# Patient Record
Sex: MALE | Race: WHITE | NOT HISPANIC OR LATINO | URBAN - METROPOLITAN AREA
[De-identification: names, ages, dates, MRNs, and addresses within clinical notes are randomized per-mention and may not be internally consistent; named-entity substitution may affect disease eponyms.]

---

## 2024-03-11 ENCOUNTER — HOSPITAL ENCOUNTER (OUTPATIENT)
Dept: RADIOLOGY | Facility: EXTERNAL LOCATION | Age: 24
Discharge: HOME | End: 2024-03-11

## 2024-03-11 DIAGNOSIS — M25.561 RIGHT KNEE PAIN, UNSPECIFIED CHRONICITY: ICD-10-CM

## 2025-05-06 ENCOUNTER — APPOINTMENT (OUTPATIENT)
Dept: PRIMARY CARE | Facility: CLINIC | Age: 25
End: 2025-05-06
Payer: COMMERCIAL

## 2025-05-06 VITALS
WEIGHT: 147 LBS | RESPIRATION RATE: 16 BRPM | BODY MASS INDEX: 20.58 KG/M2 | HEIGHT: 71 IN | HEART RATE: 65 BPM | DIASTOLIC BLOOD PRESSURE: 76 MMHG | SYSTOLIC BLOOD PRESSURE: 135 MMHG

## 2025-05-06 DIAGNOSIS — Z00.01 ENCOUNTER FOR GENERAL ADULT MEDICAL EXAMINATION WITH ABNORMAL FINDINGS: Primary | ICD-10-CM

## 2025-05-06 DIAGNOSIS — J30.9 ALLERGIC RHINITIS, UNSPECIFIED SEASONALITY, UNSPECIFIED TRIGGER: ICD-10-CM

## 2025-05-06 DIAGNOSIS — R23.9 SKIN CHANGE: ICD-10-CM

## 2025-05-06 PROCEDURE — 3008F BODY MASS INDEX DOCD: CPT | Performed by: FAMILY MEDICINE

## 2025-05-06 PROCEDURE — 99385 PREV VISIT NEW AGE 18-39: CPT | Performed by: FAMILY MEDICINE

## 2025-05-06 PROCEDURE — 1036F TOBACCO NON-USER: CPT | Performed by: FAMILY MEDICINE

## 2025-05-06 RX ORDER — CETIRIZINE HYDROCHLORIDE 10 MG/1
10 TABLET ORAL DAILY
Qty: 30 TABLET | Refills: 2 | Status: SHIPPED | OUTPATIENT
Start: 2025-05-06 | End: 2025-08-04

## 2025-05-06 RX ORDER — GUAIFENESIN 600 MG/1
1200 TABLET, EXTENDED RELEASE ORAL 2 TIMES DAILY
Qty: 40 TABLET | Refills: 0 | Status: SHIPPED | OUTPATIENT
Start: 2025-05-06 | End: 2025-06-05

## 2025-05-06 RX ORDER — FLUTICASONE PROPIONATE 50 MCG
1 SPRAY, SUSPENSION (ML) NASAL DAILY
Qty: 16 G | Refills: 11 | Status: SHIPPED | OUTPATIENT
Start: 2025-05-06 | End: 2026-05-06

## 2025-05-06 NOTE — PATIENT INSTRUCTIONS
USE [email or web address].  CALL FOR NEEDS [phone].   KEEP ON MEDICATIONS  KEEP SPECIALTY APPOINTMENTS.    EVALUATE AND TREAT FOR ALLERIES CALL DR FREDY RECINOS [phone]   GET FASTED LABS DONE.   KEEP DERM CARE DR GORAN MARTINEZ.

## 2025-05-06 NOTE — PROGRESS NOTES
"Subjective   Patient ID: Mahad Haji is a 24 y.o. male who presents for Establish Care (BLOODWORK-IGG/LIPID PANEL/DISCUSS FOOD ALLERGIES/SEASONAL ALLERGIES ).    HPI    Establish Care (BLOODWORK-IGG/LIPID PANEL/DISCUSS FOOD ALLERGIES/SEASONAL ALLERGIES ).    LAST PCP 3 YRS AGO  SPECIALISTS:   NUTRITIONIST AND DIETICIAN., ASKS FOR LABS AND IGG PANEL AND SENSITIVITIES.      Review of Systems   All other systems reviewed and are negative.    THROAT PAIN AND FOOD POISONING LATELY.      LICORICE SUPPLEMENT FOR GERD SXS RELIEF.    MAY BENEFIT WITH PPI PPX MUCINEX AND OR SIMETHICONE.     Objective   /76   Pulse 65   Resp 16   Ht 1.803 m (5' 11\")   Wt 66.7 kg (147 lb)   BMI 20.50 kg/m²     Physical Exam  Vitals and nursing note reviewed.   Constitutional:       Appearance: Normal appearance.      Comments: TALL THIN MALE POINTS TO TENDERNESS AT LATERAL PROXIMAL RT TIBIA. BENIGN EXAM.     HENT:      Head: Normocephalic.      Right Ear: Tympanic membrane, ear canal and external ear normal.      Left Ear: Tympanic membrane, ear canal and external ear normal.      Nose: Nose normal.      Mouth/Throat:      Mouth: Mucous membranes are moist.      Pharynx: Oropharynx is clear.   Eyes:      Conjunctiva/sclera: Conjunctivae normal.      Pupils: Pupils are equal, round, and reactive to light.   Cardiovascular:      Rate and Rhythm: Normal rate and regular rhythm.      Pulses: Normal pulses.      Heart sounds: Normal heart sounds.   Pulmonary:      Effort: Pulmonary effort is normal.      Breath sounds: Normal breath sounds.   Abdominal:      General: Bowel sounds are normal.      Palpations: Abdomen is soft.   Musculoskeletal:         General: Normal range of motion.      Cervical back: Normal range of motion and neck supple.   Skin:     General: Skin is warm and dry.   Neurological:      General: No focal deficit present.      Mental Status: Mental status is at baseline.   Psychiatric:         Mood and Affect: Mood " normal.         Behavior: Behavior normal.         Thought Content: Thought content normal.         Judgment: Judgment normal.         Assessment/Plan   Assessment & Plan  Encounter for general adult medical examination with abnormal findings    Orders:    Lipid Panel; Future    Sedimentation Rate; Future    Urinalysis with Reflex Microscopic; Future    TSH with reflex to Free T4 if abnormal; Future    Comprehensive Metabolic Panel; Future    CBC and Auto Differential; Future    Follow Up In Primary Care - Established; Future    Hepatitis C antibody; Future    HIV 1/2 Antigen/Antibody Screen with Reflex to Confirmation; Future    Varicella zoster antibody, IgG; Future    Measles (Rubeola) Antibody, IgG; Future    Allergic rhinitis, unspecified seasonality, unspecified trigger    Orders:    cetirizine (ZyrTEC) 10 mg tablet; Take 1 tablet (10 mg) by mouth once daily.    fluticasone (Flonase) 50 mcg/actuation nasal spray; Administer 1 spray into each nostril once daily. Shake gently. Before first use, prime pump. After use, clean tip and replace cap.    Lipid Panel; Future    Sedimentation Rate; Future    Urinalysis with Reflex Microscopic; Future    TSH with reflex to Free T4 if abnormal; Future    Comprehensive Metabolic Panel; Future    CBC and Auto Differential; Future    Gluten IgG; Future    Peanut IgG4; Future    Wheat IgG; Future    Egg, White IgG; Future    Respiratory Allergy Profile IgE; Future    Brazil Nut Component Panel; Future    Macadamia nut IgE; Future    Referral to Allergy; Future    Follow Up In Primary Care - Established; Future    Varicella zoster antibody, IgG; Future    Measles (Rubeola) Antibody, IgG; Future    guaiFENesin (Mucinex) 600 mg 12 hr tablet; Take 2 tablets (1,200 mg) by mouth 2 times a day. Do not crush, chew, or split.    Skin change    Orders:    Referral to Dermatology

## 2025-05-11 LAB
A ALTERNATA IGE QN: NORMAL
A ALTERNATA IGE RAST: NORMAL
A FUMIGATUS IGE QN: NORMAL
A FUMIGATUS IGE RAST: NORMAL
ALBUMIN SERPL-MCNC: 4.7 G/DL (ref 3.6–5.1)
ALP SERPL-CCNC: 55 U/L (ref 36–130)
ALT SERPL-CCNC: 28 U/L (ref 9–46)
ANION GAP SERPL CALCULATED.4IONS-SCNC: 8 MMOL/L (CALC) (ref 7–17)
APPEARANCE UR: CLEAR
AST SERPL-CCNC: 16 U/L (ref 10–40)
BASOPHILS # BLD AUTO: 61 CELLS/UL (ref 0–200)
BASOPHILS NFR BLD AUTO: 1.1 %
BERMUDA GRASS IGE QN: NORMAL
BERMUDA GRASS IGE RAST: NORMAL
BILIRUB SERPL-MCNC: 0.5 MG/DL (ref 0.2–1.2)
BILIRUB UR QL STRIP: NEGATIVE
BOXELDER IGE QN: NORMAL
BOXELDER IGE RAST: NORMAL
BRAZIL NUT (RBER E) 1 IGE QN: NORMAL
BUN SERPL-MCNC: 19 MG/DL (ref 7–25)
C HERBARUM IGE QN: NORMAL
C HERBARUM IGE RAST: NORMAL
CALCIUM SERPL-MCNC: 10 MG/DL (ref 8.6–10.3)
CALIF WALNUT POLN IGE QN: NORMAL
CALIF WALNUT POLN IGE RAST: NORMAL
CAT DANDER IGE QN: NORMAL
CAT DANDER IGE RAST: NORMAL
CHLORIDE SERPL-SCNC: 102 MMOL/L (ref 98–110)
CHOLEST SERPL-MCNC: 140 MG/DL
CHOLEST/HDLC SERPL: 2.5 (CALC)
CMN PIGWEED IGE QN: NORMAL
CMN PIGWEED IGE RAST: NORMAL
CO2 SERPL-SCNC: 30 MMOL/L (ref 20–32)
COLOR UR: YELLOW
COMMON RAGWEED IGE QN: NORMAL
COMMON RAGWEED IGE RAST: NORMAL
COTTONWOOD IGE QN: NORMAL
COTTONWOOD IGE RAST: NORMAL
CREAT SERPL-MCNC: 1.01 MG/DL (ref 0.6–1.24)
D FARINAE IGE QN: NORMAL
D FARINAE IGE RAST: NORMAL
D PTERONYSS IGE QN: NORMAL
D PTERONYSS IGE RAST: NORMAL
DOG DANDER IGE QN: NORMAL
DOG DANDER IGE RAST: NORMAL
EGFRCR SERPLBLD CKD-EPI 2021: 105 ML/MIN/1.73M2
EGG WHITE IGG-MCNC: NORMAL UG/ML
EOSINOPHIL # BLD AUTO: 182 CELLS/UL (ref 15–500)
EOSINOPHIL NFR BLD AUTO: 3.3 %
ERYTHROCYTE [DISTWIDTH] IN BLOOD BY AUTOMATED COUNT: 12.6 % (ref 11–15)
ERYTHROCYTE [SEDIMENTATION RATE] IN BLOOD BY WESTERGREN METHOD: 2 MM/H
GLUCOSE SERPL-MCNC: 89 MG/DL (ref 65–99)
GLUCOSE UR QL STRIP: NEGATIVE
GLUTEN IGG-MCNC: NORMAL UG/ML
HCT VFR BLD AUTO: 46.1 % (ref 38.5–50)
HCV AB SERPL QL IA: NORMAL
HDLC SERPL-MCNC: 56 MG/DL
HGB BLD-MCNC: 15 G/DL (ref 13.2–17.1)
HGB UR QL STRIP: NEGATIVE
HIV 1+2 AB+HIV1 P24 AG SERPL QL IA: NORMAL
IGE SERPL-ACNC: NORMAL [IU]/L
KETONES UR QL STRIP: NEGATIVE
LDLC SERPL CALC-MCNC: 69 MG/DL (CALC)
LEUKOCYTE ESTERASE UR QL STRIP: NEGATIVE
LONDON PLANE IGE QN: NORMAL
LONDON PLANE IGE RAST: NORMAL
LYMPHOCYTES # BLD AUTO: 1848 CELLS/UL (ref 850–3900)
LYMPHOCYTES NFR BLD AUTO: 33.6 %
MACADAMIA IGE QN: NORMAL
MACADAMIA IGE RAST: NORMAL
MCH RBC QN AUTO: 29.3 PG (ref 27–33)
MCHC RBC AUTO-ENTMCNC: 32.5 G/DL (ref 32–36)
MCV RBC AUTO: 90 FL (ref 80–100)
MEV IGG SER IA-ACNC: 244 AU/ML
MONOCYTES # BLD AUTO: 512 CELLS/UL (ref 200–950)
MONOCYTES NFR BLD AUTO: 9.3 %
MOUSE URINE PROT IGE QN: NORMAL
MOUSE URINE PROT IGE RAST: NORMAL
MT JUNIPER IGE QN: NORMAL
MT JUNIPER IGE RAST: NORMAL
NEUTROPHILS # BLD AUTO: 2899 CELLS/UL (ref 1500–7800)
NEUTROPHILS NFR BLD AUTO: 52.7 %
NITRITE UR QL STRIP: NEGATIVE
NONHDLC SERPL-MCNC: 84 MG/DL (CALC)
P NOTATUM IGE QN: NORMAL
P NOTATUM IGE RAST: NORMAL
PEANUT IGG4 AB [MASS/VOLUME] IN SERUM: NORMAL UG/ML
PECAN/HICK TREE IGE QN: NORMAL
PECAN/HICK TREE IGE RAST: NORMAL
PH UR STRIP: 5.5 [PH] (ref 5–8)
PLATELET # BLD AUTO: 240 THOUSAND/UL (ref 140–400)
PMV BLD REES-ECKER: 10.1 FL (ref 7.5–12.5)
POTASSIUM SERPL-SCNC: 4.3 MMOL/L (ref 3.5–5.3)
PROT SERPL-MCNC: 7 G/DL (ref 6.1–8.1)
PROT UR QL STRIP: NEGATIVE
RBC # BLD AUTO: 5.12 MILLION/UL (ref 4.2–5.8)
REF LAB TEST REF RANGE: NORMAL
ROACH IGE QN: NORMAL
ROACH IGE RAST: NORMAL
SALTWORT IGE QN: NORMAL
SALTWORT IGE RAST: NORMAL
SHEEP SORREL IGE QN: NORMAL
SHEEP SORREL IGE RAST: NORMAL
SILVER BIRCH IGE QN: NORMAL
SILVER BIRCH IGE RAST: NORMAL
SODIUM SERPL-SCNC: 140 MMOL/L (ref 135–146)
SP GR UR STRIP: 1.03 (ref 1–1.03)
TIMOTHY IGE QN: NORMAL
TIMOTHY IGE RAST: NORMAL
TRIGL SERPL-MCNC: 69 MG/DL
TSH SERPL-ACNC: 1.47 MIU/L (ref 0.4–4.5)
VZV IGG SER IA-ACNC: <1 S/CO
WBC # BLD AUTO: 5.5 THOUSAND/UL (ref 3.8–10.8)
WHEAT IGG-MCNC: NORMAL UG/ML
WHITE ASH IGE QN: NORMAL
WHITE ASH IGE RAST: NORMAL
WHITE ELM IGE QN: NORMAL
WHITE ELM IGE RAST: NORMAL
WHITE MULBERRY IGE QN: NORMAL
WHITE MULBERRY IGE RAST: NORMAL
WHITE OAK IGE QN: NORMAL
WHITE OAK IGE RAST: NORMAL

## 2025-05-13 LAB
A ALTERNATA IGE QN: <0.1 KU/L
A ALTERNATA IGE RAST: 0
A FUMIGATUS IGE QN: <0.1 KU/L
A FUMIGATUS IGE RAST: 0
ALBUMIN SERPL-MCNC: 4.7 G/DL (ref 3.6–5.1)
ALP SERPL-CCNC: 55 U/L (ref 36–130)
ALT SERPL-CCNC: 28 U/L (ref 9–46)
ANION GAP SERPL CALCULATED.4IONS-SCNC: 8 MMOL/L (CALC) (ref 7–17)
APPEARANCE UR: CLEAR
AST SERPL-CCNC: 16 U/L (ref 10–40)
BASOPHILS # BLD AUTO: 61 CELLS/UL (ref 0–200)
BASOPHILS NFR BLD AUTO: 1.1 %
BERMUDA GRASS IGE QN: 0.66 KU/L
BERMUDA GRASS IGE RAST: 1
BILIRUB SERPL-MCNC: 0.5 MG/DL (ref 0.2–1.2)
BILIRUB UR QL STRIP: NEGATIVE
BOXELDER IGE QN: 3.21 KU/L
BOXELDER IGE RAST: 2
BRAZIL NUT (RBER E) 1 IGE QN: <0.1 KU/L
BUN SERPL-MCNC: 19 MG/DL (ref 7–25)
C HERBARUM IGE QN: <0.1 KU/L
C HERBARUM IGE RAST: 0
CALCIUM SERPL-MCNC: 10 MG/DL (ref 8.6–10.3)
CALIF WALNUT POLN IGE QN: 1.04 KU/L
CALIF WALNUT POLN IGE RAST: 2
CAT (RFEL D) 1 IGE QN: 1.64 KU/L
CAT (RFEL D) 4 IGE QN: <0.1 KU/L
CAT (RFEL D) 7 IGE QN: <0.1 KU/L
CAT DANDER IGE QN: 1.33 KU/L
CAT DANDER IGE RAST: 2
CHLORIDE SERPL-SCNC: 102 MMOL/L (ref 98–110)
CHOLEST SERPL-MCNC: 140 MG/DL
CHOLEST/HDLC SERPL: 2.5 (CALC)
CMN PIGWEED IGE QN: 0.71 KU/L
CMN PIGWEED IGE RAST: 2
CO2 SERPL-SCNC: 30 MMOL/L (ref 20–32)
COLOR UR: YELLOW
COMMON RAGWEED IGE QN: 0.13 KU/L
COMMON RAGWEED IGE RAST: ABNORMAL
COTTONWOOD IGE QN: 0.34 KU/L
COTTONWOOD IGE RAST: ABNORMAL
CREAT SERPL-MCNC: 1.01 MG/DL (ref 0.6–1.24)
D FARINAE IGE QN: 14.9 KU/L
D FARINAE IGE RAST: 3
D PTERONYSS IGE QN: 8.79 KU/L
D PTERONYSS IGE RAST: 3
DOG (RCAN F) 1 IGE QN: <0.1 KU/L
DOG (RCAN F) 2 IGE QN: <0.1 KU/L
DOG (RCAN F) 4 IGE QN: <0.1 KU/L
DOG (RCAN F) 5 IGE QN: <0.1 KU/L
DOG (RCAN F) 6 IGE QN: 0.34 KU/L
DOG DANDER IGE QN: 0.69 KU/L
DOG DANDER IGE RAST: 1
EGFRCR SERPLBLD CKD-EPI 2021: 105 ML/MIN/1.73M2
EGG WHITE IGG-MCNC: NORMAL UG/ML
EOSINOPHIL # BLD AUTO: 182 CELLS/UL (ref 15–500)
EOSINOPHIL NFR BLD AUTO: 3.3 %
ERYTHROCYTE [DISTWIDTH] IN BLOOD BY AUTOMATED COUNT: 12.6 % (ref 11–15)
ERYTHROCYTE [SEDIMENTATION RATE] IN BLOOD BY WESTERGREN METHOD: 2 MM/H
GLUCOSE SERPL-MCNC: 89 MG/DL (ref 65–99)
GLUCOSE UR QL STRIP: NEGATIVE
GLUTEN IGG-MCNC: NORMAL UG/ML
HCT VFR BLD AUTO: 46.1 % (ref 38.5–50)
HCV AB SERPL QL IA: NORMAL
HDLC SERPL-MCNC: 56 MG/DL
HGB BLD-MCNC: 15 G/DL (ref 13.2–17.1)
HGB UR QL STRIP: NEGATIVE
HIV 1+2 AB+HIV1 P24 AG SERPL QL IA: NORMAL
IGE SERPL-ACNC: 243 KU/L
KETONES UR QL STRIP: NEGATIVE
LDLC SERPL CALC-MCNC: 69 MG/DL (CALC)
LEUKOCYTE ESTERASE UR QL STRIP: NEGATIVE
LONDON PLANE IGE QN: 0.18 KU/L
LONDON PLANE IGE RAST: ABNORMAL
LYMPHOCYTES # BLD AUTO: 1848 CELLS/UL (ref 850–3900)
LYMPHOCYTES NFR BLD AUTO: 33.6 %
MACADAMIA IGE QN: <0.1 KU/L
MACADAMIA IGE RAST: 0
MCH RBC QN AUTO: 29.3 PG (ref 27–33)
MCHC RBC AUTO-ENTMCNC: 32.5 G/DL (ref 32–36)
MCV RBC AUTO: 90 FL (ref 80–100)
MEV IGG SER IA-ACNC: 244 AU/ML
MONOCYTES # BLD AUTO: 512 CELLS/UL (ref 200–950)
MONOCYTES NFR BLD AUTO: 9.3 %
MOUSE URINE PROT IGE QN: <0.1 KU/L
MOUSE URINE PROT IGE RAST: 0
MT JUNIPER IGE QN: <0.1 KU/L
MT JUNIPER IGE RAST: 0
NEUTROPHILS # BLD AUTO: 2899 CELLS/UL (ref 1500–7800)
NEUTROPHILS NFR BLD AUTO: 52.7 %
NITRITE UR QL STRIP: NEGATIVE
NONHDLC SERPL-MCNC: 84 MG/DL (CALC)
P NOTATUM IGE QN: <0.1 KU/L
P NOTATUM IGE RAST: 0
PEANUT IGG4 AB [MASS/VOLUME] IN SERUM: <0.3 MCG/ML
PECAN/HICK TREE IGE QN: 4.51 KU/L
PECAN/HICK TREE IGE RAST: 3
PH UR STRIP: 5.5 [PH] (ref 5–8)
PLATELET # BLD AUTO: 240 THOUSAND/UL (ref 140–400)
PMV BLD REES-ECKER: 10.1 FL (ref 7.5–12.5)
POTASSIUM SERPL-SCNC: 4.3 MMOL/L (ref 3.5–5.3)
PROT SERPL-MCNC: 7 G/DL (ref 6.1–8.1)
PROT UR QL STRIP: NEGATIVE
QUEST CAT DANDER COMP PNL FEL D 2 (E220) IGE: <0.1 KU/L
QUEST DOG DANDER COMP PNL CAN F 3 (E221) IGE: <0.1 KU/L
RBC # BLD AUTO: 5.12 MILLION/UL (ref 4.2–5.8)
REF LAB TEST REF RANGE: NORMAL
ROACH IGE QN: <0.1 KU/L
ROACH IGE RAST: 0
SALTWORT IGE QN: 0.86 KU/L
SALTWORT IGE RAST: 2
SHEEP SORREL IGE QN: 0.11 KU/L
SHEEP SORREL IGE RAST: ABNORMAL
SILVER BIRCH IGE QN: 18.1 KU/L
SILVER BIRCH IGE RAST: 4
SODIUM SERPL-SCNC: 140 MMOL/L (ref 135–146)
SP GR UR STRIP: 1.03 (ref 1–1.03)
TIMOTHY IGE QN: 24 KU/L
TIMOTHY IGE RAST: 4
TRIGL SERPL-MCNC: 69 MG/DL
TSH SERPL-ACNC: 1.47 MIU/L (ref 0.4–4.5)
VZV IGG SER IA-ACNC: <1 S/CO
WBC # BLD AUTO: 5.5 THOUSAND/UL (ref 3.8–10.8)
WHEAT IGG-MCNC: NORMAL UG/ML
WHITE ASH IGE QN: 2.24 KU/L
WHITE ASH IGE RAST: 2
WHITE ELM IGE QN: 1.05 KU/L
WHITE ELM IGE RAST: 2
WHITE MULBERRY IGE QN: <0.1 KU/L
WHITE MULBERRY IGE RAST: 0
WHITE OAK IGE QN: 17.5 KU/L
WHITE OAK IGE RAST: 4

## 2025-05-19 LAB
A ALTERNATA IGE QN: <0.1 KU/L
A ALTERNATA IGE RAST: 0
A FUMIGATUS IGE QN: <0.1 KU/L
A FUMIGATUS IGE RAST: 0
ALBUMIN SERPL-MCNC: 4.7 G/DL (ref 3.6–5.1)
ALP SERPL-CCNC: 55 U/L (ref 36–130)
ALT SERPL-CCNC: 28 U/L (ref 9–46)
ANION GAP SERPL CALCULATED.4IONS-SCNC: 8 MMOL/L (CALC) (ref 7–17)
APPEARANCE UR: CLEAR
AST SERPL-CCNC: 16 U/L (ref 10–40)
BASOPHILS # BLD AUTO: 61 CELLS/UL (ref 0–200)
BASOPHILS NFR BLD AUTO: 1.1 %
BERMUDA GRASS IGE QN: 0.66 KU/L
BERMUDA GRASS IGE RAST: 1
BILIRUB SERPL-MCNC: 0.5 MG/DL (ref 0.2–1.2)
BILIRUB UR QL STRIP: NEGATIVE
BOXELDER IGE QN: 3.21 KU/L
BOXELDER IGE RAST: 2
BRAZIL NUT (RBER E) 1 IGE QN: <0.1 KU/L
BUN SERPL-MCNC: 19 MG/DL (ref 7–25)
C HERBARUM IGE QN: <0.1 KU/L
C HERBARUM IGE RAST: 0
CALCIUM SERPL-MCNC: 10 MG/DL (ref 8.6–10.3)
CALIF WALNUT POLN IGE QN: 1.04 KU/L
CALIF WALNUT POLN IGE RAST: 2
CAT (RFEL D) 1 IGE QN: 1.64 KU/L
CAT (RFEL D) 4 IGE QN: <0.1 KU/L
CAT (RFEL D) 7 IGE QN: <0.1 KU/L
CAT DANDER IGE QN: 1.33 KU/L
CAT DANDER IGE RAST: 2
CHLORIDE SERPL-SCNC: 102 MMOL/L (ref 98–110)
CHOLEST SERPL-MCNC: 140 MG/DL
CHOLEST/HDLC SERPL: 2.5 (CALC)
CMN PIGWEED IGE QN: 0.71 KU/L
CMN PIGWEED IGE RAST: 2
CO2 SERPL-SCNC: 30 MMOL/L (ref 20–32)
COLOR UR: YELLOW
COMMON RAGWEED IGE QN: 0.13 KU/L
COMMON RAGWEED IGE RAST: ABNORMAL
COTTONWOOD IGE QN: 0.34 KU/L
COTTONWOOD IGE RAST: ABNORMAL
CREAT SERPL-MCNC: 1.01 MG/DL (ref 0.6–1.24)
D FARINAE IGE QN: 14.9 KU/L
D FARINAE IGE RAST: 3
D PTERONYSS IGE QN: 8.79 KU/L
D PTERONYSS IGE RAST: 3
DOG (RCAN F) 1 IGE QN: <0.1 KU/L
DOG (RCAN F) 2 IGE QN: <0.1 KU/L
DOG (RCAN F) 4 IGE QN: <0.1 KU/L
DOG (RCAN F) 5 IGE QN: <0.1 KU/L
DOG (RCAN F) 6 IGE QN: 0.34 KU/L
DOG DANDER IGE QN: 0.69 KU/L
DOG DANDER IGE RAST: 1
EGFRCR SERPLBLD CKD-EPI 2021: 105 ML/MIN/1.73M2
EGG WHITE IGG-MCNC: 11 MCG/ML
EOSINOPHIL # BLD AUTO: 182 CELLS/UL (ref 15–500)
EOSINOPHIL NFR BLD AUTO: 3.3 %
ERYTHROCYTE [DISTWIDTH] IN BLOOD BY AUTOMATED COUNT: 12.6 % (ref 11–15)
ERYTHROCYTE [SEDIMENTATION RATE] IN BLOOD BY WESTERGREN METHOD: 2 MM/H
GLUCOSE SERPL-MCNC: 89 MG/DL (ref 65–99)
GLUCOSE UR QL STRIP: NEGATIVE
GLUTEN IGG-MCNC: 14.5 MCG/ML
HCT VFR BLD AUTO: 46.1 % (ref 38.5–50)
HCV AB SERPL QL IA: NORMAL
HDLC SERPL-MCNC: 56 MG/DL
HGB BLD-MCNC: 15 G/DL (ref 13.2–17.1)
HGB UR QL STRIP: NEGATIVE
HIV 1+2 AB+HIV1 P24 AG SERPL QL IA: NORMAL
IGE SERPL-ACNC: 243 KU/L
KETONES UR QL STRIP: NEGATIVE
LDLC SERPL CALC-MCNC: 69 MG/DL (CALC)
LEUKOCYTE ESTERASE UR QL STRIP: NEGATIVE
LONDON PLANE IGE QN: 0.18 KU/L
LONDON PLANE IGE RAST: ABNORMAL
LYMPHOCYTES # BLD AUTO: 1848 CELLS/UL (ref 850–3900)
LYMPHOCYTES NFR BLD AUTO: 33.6 %
MACADAMIA IGE QN: <0.1 KU/L
MACADAMIA IGE RAST: 0
MCH RBC QN AUTO: 29.3 PG (ref 27–33)
MCHC RBC AUTO-ENTMCNC: 32.5 G/DL (ref 32–36)
MCV RBC AUTO: 90 FL (ref 80–100)
MEV IGG SER IA-ACNC: 244 AU/ML
MONOCYTES # BLD AUTO: 512 CELLS/UL (ref 200–950)
MONOCYTES NFR BLD AUTO: 9.3 %
MOUSE URINE PROT IGE QN: <0.1 KU/L
MOUSE URINE PROT IGE RAST: 0
MT JUNIPER IGE QN: <0.1 KU/L
MT JUNIPER IGE RAST: 0
NEUTROPHILS # BLD AUTO: 2899 CELLS/UL (ref 1500–7800)
NEUTROPHILS NFR BLD AUTO: 52.7 %
NITRITE UR QL STRIP: NEGATIVE
NONHDLC SERPL-MCNC: 84 MG/DL (CALC)
P NOTATUM IGE QN: <0.1 KU/L
P NOTATUM IGE RAST: 0
PEANUT IGG4 AB [MASS/VOLUME] IN SERUM: <0.3 MCG/ML
PECAN/HICK TREE IGE QN: 4.51 KU/L
PECAN/HICK TREE IGE RAST: 3
PH UR STRIP: 5.5 [PH] (ref 5–8)
PLATELET # BLD AUTO: 240 THOUSAND/UL (ref 140–400)
PMV BLD REES-ECKER: 10.1 FL (ref 7.5–12.5)
POTASSIUM SERPL-SCNC: 4.3 MMOL/L (ref 3.5–5.3)
PROT SERPL-MCNC: 7 G/DL (ref 6.1–8.1)
PROT UR QL STRIP: NEGATIVE
QUEST CAT DANDER COMP PNL FEL D 2 (E220) IGE: <0.1 KU/L
QUEST DOG DANDER COMP PNL CAN F 3 (E221) IGE: <0.1 KU/L
RBC # BLD AUTO: 5.12 MILLION/UL (ref 4.2–5.8)
REF LAB TEST REF RANGE: NORMAL
ROACH IGE QN: <0.1 KU/L
ROACH IGE RAST: 0
SALTWORT IGE QN: 0.86 KU/L
SALTWORT IGE RAST: 2
SHEEP SORREL IGE QN: 0.11 KU/L
SHEEP SORREL IGE RAST: ABNORMAL
SILVER BIRCH IGE QN: 18.1 KU/L
SILVER BIRCH IGE RAST: 4
SODIUM SERPL-SCNC: 140 MMOL/L (ref 135–146)
SP GR UR STRIP: 1.03 (ref 1–1.03)
TIMOTHY IGE QN: 24 KU/L
TIMOTHY IGE RAST: 4
TRIGL SERPL-MCNC: 69 MG/DL
TSH SERPL-ACNC: 1.47 MIU/L (ref 0.4–4.5)
VZV IGG SER IA-ACNC: <1 S/CO
WBC # BLD AUTO: 5.5 THOUSAND/UL (ref 3.8–10.8)
WHEAT IGG-MCNC: 18 MCG/ML
WHITE ASH IGE QN: 2.24 KU/L
WHITE ASH IGE RAST: 2
WHITE ELM IGE QN: 1.05 KU/L
WHITE ELM IGE RAST: 2
WHITE MULBERRY IGE QN: <0.1 KU/L
WHITE MULBERRY IGE RAST: 0
WHITE OAK IGE QN: 17.5 KU/L
WHITE OAK IGE RAST: 4

## 2025-05-28 DIAGNOSIS — J30.9 ALLERGIC RHINITIS, UNSPECIFIED SEASONALITY, UNSPECIFIED TRIGGER: ICD-10-CM

## 2025-05-28 RX ORDER — CETIRIZINE HYDROCHLORIDE 10 MG/1
10 TABLET ORAL DAILY
Qty: 90 TABLET | Refills: 1 | Status: SHIPPED | OUTPATIENT
Start: 2025-05-28

## 2025-06-06 ENCOUNTER — APPOINTMENT (OUTPATIENT)
Dept: PRIMARY CARE | Facility: CLINIC | Age: 25
End: 2025-06-06
Payer: COMMERCIAL

## 2025-06-06 VITALS
TEMPERATURE: 98.1 F | WEIGHT: 148 LBS | SYSTOLIC BLOOD PRESSURE: 113 MMHG | HEART RATE: 72 BPM | DIASTOLIC BLOOD PRESSURE: 58 MMHG | BODY MASS INDEX: 20.64 KG/M2 | RESPIRATION RATE: 16 BRPM | OXYGEN SATURATION: 100 %

## 2025-06-06 DIAGNOSIS — Z00.01 ENCOUNTER FOR GENERAL ADULT MEDICAL EXAMINATION WITH ABNORMAL FINDINGS: ICD-10-CM

## 2025-06-06 DIAGNOSIS — J30.9 ALLERGIC RHINITIS, UNSPECIFIED SEASONALITY, UNSPECIFIED TRIGGER: Primary | ICD-10-CM

## 2025-06-06 DIAGNOSIS — G43.009 MIGRAINE WITHOUT AURA AND WITHOUT STATUS MIGRAINOSUS, NOT INTRACTABLE: ICD-10-CM

## 2025-06-06 PROCEDURE — 1036F TOBACCO NON-USER: CPT | Performed by: FAMILY MEDICINE

## 2025-06-06 PROCEDURE — 99395 PREV VISIT EST AGE 18-39: CPT | Performed by: FAMILY MEDICINE

## 2025-06-06 RX ORDER — CETIRIZINE HYDROCHLORIDE, PSEUDOEPHEDRINE HYDROCHLORIDE 5; 120 MG/1; MG/1
1 TABLET, FILM COATED, EXTENDED RELEASE ORAL 2 TIMES DAILY
Qty: 60 TABLET | Refills: 11 | Status: SHIPPED | OUTPATIENT
Start: 2025-06-06 | End: 2026-06-06

## 2025-06-06 RX ORDER — FLUTICASONE PROPIONATE 50 MCG
1 SPRAY, SUSPENSION (ML) NASAL DAILY
Qty: 16 G | Refills: 11 | Status: SHIPPED | OUTPATIENT
Start: 2025-06-06 | End: 2026-06-06

## 2025-06-06 RX ORDER — CETIRIZINE HYDROCHLORIDE 10 MG/1
10 TABLET ORAL DAILY
Qty: 90 TABLET | Refills: 1 | Status: SHIPPED | OUTPATIENT
Start: 2025-06-06

## 2025-06-06 NOTE — PROGRESS NOTES
Subjective   Patient ID: Mahad Haji is a 24 y.o. male who presents for URI (Congestion ).    URI        2 hrs outdoors Wednesday and awoke sinus congestion & feeling poorly.    FLONASE AND ZYRTEC IN USE AND CANNOT BREATH VIA LEFT NOSTRIL.      #5 MIGRAINE DAYS PER MONTH.  SAMPLED [facility]UR[facility] #2 TODAY:  CALL AND LET ME KNOW YOUR RESPONSE.      GOOD LABS MAY 2025.      Review of Systems   All other systems reviewed and are negative.      Objective   /58   Pulse 72   Temp 36.7 °C (98.1 °F) (Temporal)   Resp 16   Wt 67.1 kg (148 lb)   SpO2 100%   BMI 20.64 kg/m²     Physical Exam  Vitals and nursing note reviewed.   Constitutional:       Appearance: Normal appearance.      Comments: NASAL VOICE CLEAR FLUID AT TM BILAT.  NO SINUS TENDERNESS TO PALPATION.  BENIGN EXAM.     HENT:      Head: Normocephalic.      Right Ear: Tympanic membrane, ear canal and external ear normal.      Left Ear: Tympanic membrane, ear canal and external ear normal.      Nose: Nose normal.      Mouth/Throat:      Mouth: Mucous membranes are moist.      Pharynx: Oropharynx is clear.   Eyes:      Conjunctiva/sclera: Conjunctivae normal.      Pupils: Pupils are equal, round, and reactive to light.   Cardiovascular:      Rate and Rhythm: Normal rate and regular rhythm.      Pulses: Normal pulses.      Heart sounds: Normal heart sounds.   Pulmonary:      Effort: Pulmonary effort is normal.      Breath sounds: Normal breath sounds.   Abdominal:      General: Bowel sounds are normal.      Palpations: Abdomen is soft.   Musculoskeletal:         General: Normal range of motion.      Cervical back: Normal range of motion and neck supple.   Skin:     General: Skin is warm and dry.   Neurological:      General: No focal deficit present.      Mental Status: Mental status is at baseline.   Psychiatric:         Mood and Affect: Mood normal.         Behavior: Behavior normal.         Thought Content: Thought content normal.         Judgment: Judgment  normal.         Assessment/Plan   Assessment & Plan  Encounter for general adult medical examination with abnormal findings    Orders:    Follow Up In Primary Care - Established    Follow Up In Primary Care - Health Maintenance; Future    Allergic rhinitis, unspecified seasonality, unspecified trigger    Orders:    Follow Up In Primary Care - Established    cetirizine-pseudoephedrine (ZyrTEC-D) 5-120 mg 12 hr tablet; Take 1 tablet by mouth 2 times a day.    fluticasone (Flonase) 50 mcg/actuation nasal spray; Administer 1 spray into each nostril once daily. Shake gently. Before first use, prime pump. After use, clean tip and replace cap.    cetirizine (ZyrTEC) 10 mg tablet; Take 1 tablet (10 mg) by mouth once daily.

## 2025-06-06 NOTE — PATIENT INSTRUCTIONS
USE [employer].  CALL FOR NEEDS [phone].   KEEP ON MEDICATIONS  KEEP SPECIALTY APPOINTMENTS.    USE BEST TECHNIQUE FOR FLONASE.  CAN TRY NETI POT.    ZYRTE NANCY TRIAL WITH SUDAFED NOW FOR 1 WEEK.    #5 MIGRAINE DAYS PER MONTH.  SAMPLED [facility] #2 TODAY:  CALL AND LET ME KNOW YOUR RESPONSE.    CHANGE AIR FILTERS SEASONALLY.